# Patient Record
Sex: MALE | URBAN - METROPOLITAN AREA
[De-identification: names, ages, dates, MRNs, and addresses within clinical notes are randomized per-mention and may not be internally consistent; named-entity substitution may affect disease eponyms.]

---

## 2018-12-23 ENCOUNTER — APPOINTMENT (OUTPATIENT)
Dept: URGENT CARE | Age: 33
End: 2018-12-23

## 2018-12-23 NOTE — ED PROVIDER NOTES
Patient Seen in: [facility] Emergency Department    History   Patient presents with:  Nausea/Vomiting/Diarrhea (gastrointestinal)    Stated Complaint: n/v    HPI    26-year-old male complaining of vomiting.   The patient admits to having about 10 luna (*)     BUN/CREA Ratio 24.5 (*)     Total Protein 8.4 (*)     Albumin 4.6 (*)     All other components within normal limits   CBC W/ DIFFERENTIAL - Abnormal; Notable for the following components:    Neutrophil Absolute Prelim 8.57 (*)     Neutrophil Absolu

## 2018-12-23 NOTE — ED INITIAL ASSESSMENT (HPI)
Pt states he had 9 or 10 shots of tequila last night and has been vomiting since. Since last night he states he has vomited a total of 5 times. Pt states he has nausea and abdominal pain. Pt states he does not normally drink a lot.

## (undated) NOTE — ED AVS SNAPSHOT
Ayana Soria   MRN: [id number]    Department:  [facility] Emergency Department   Date of Visit:  12/23/2018           Disclosure     Insurance plans vary and the physician(s) referred by the ER may not be covered by your plan.  Please contact y tell this physician (or your personal doctor if your instructions are to return to your personal doctor) about any new or lasting problems. The primary care or specialist physician will see patients referred from the [facility] Emergency Department.  Martha Lin